# Patient Record
Sex: FEMALE | Race: BLACK OR AFRICAN AMERICAN | NOT HISPANIC OR LATINO | Employment: UNEMPLOYED | URBAN - METROPOLITAN AREA
[De-identification: names, ages, dates, MRNs, and addresses within clinical notes are randomized per-mention and may not be internally consistent; named-entity substitution may affect disease eponyms.]

---

## 2017-01-04 PROBLEM — R07.9 CHEST PAIN: Status: ACTIVE | Noted: 2017-01-04

## 2017-01-04 PROBLEM — I26.99 PULMONARY EMBOLISM WITHOUT ACUTE COR PULMONALE: Status: ACTIVE | Noted: 2017-01-04

## 2017-01-04 PROBLEM — R59.1 LYMPHADENOPATHY: Status: ACTIVE | Noted: 2017-01-04

## 2017-01-04 PROBLEM — I26.99 PULMONARY EMBOLISM: Status: ACTIVE | Noted: 2017-01-04

## 2022-05-05 ENCOUNTER — NURSE TRIAGE (OUTPATIENT)
Dept: ADMINISTRATIVE | Facility: CLINIC | Age: 32
End: 2022-05-05

## 2022-05-05 NOTE — TELEPHONE ENCOUNTER
"38 weeks IUP and has been leaking for the past 6 hours.    Reason for Disposition   [1] Leakage of fluid from vagina AND [2] green or brown in color    Additional Information   Negative: Passed out (i.e., lost consciousness, collapsed and was not responding)   Negative: Shock suspected (e.g., cold/pale/clammy skin, too weak to stand, low BP, rapid pulse)   Negative: Difficult to awaken or acting confused (e.g., disoriented, slurred speech)   Negative: [1] SEVERE abdominal pain (e.g., excruciating) AND [2] constant AND [3] present > 1 hour   Negative: Severe bleeding (e.g., continuous red blood from vagina, or large blood clots)   Negative: Umbilical cord hanging out of the vagina (shiny, white, curled appearance, "like telephone cord")   Negative: Uncontrollable urge to push (i.e., feels like baby is coming out now)   Negative: Can see baby   Negative: Sounds like a life-threatening emergency to the triager   Negative: Pregnant < 37 weeks (i.e., )   Negative: [1] Uncertain delivery date AND [2] possibly pregnant < 37 weeks (i.e., )   Negative: [1] First baby (primipara) AND [2] contractions < 6 minutes apart  AND [3] present 2 hours   Negative: [1] History of prior delivery (multipara) AND [2] contractions < 10 minutes apart AND [3] present 1 hour   Negative: [1] History of rapid prior delivery AND [2] contractions < 10 minutes apart    Protocols used: PREGNANCY - LABOR-A-      " home